# Patient Record
Sex: FEMALE | Race: WHITE | NOT HISPANIC OR LATINO | Employment: FULL TIME | URBAN - METROPOLITAN AREA
[De-identification: names, ages, dates, MRNs, and addresses within clinical notes are randomized per-mention and may not be internally consistent; named-entity substitution may affect disease eponyms.]

---

## 2017-07-06 ENCOUNTER — OFFICE VISIT (OUTPATIENT)
Dept: FAMILY MEDICINE | Facility: CLINIC | Age: 32
End: 2017-07-06
Payer: COMMERCIAL

## 2017-07-06 VITALS
WEIGHT: 208 LBS | SYSTOLIC BLOOD PRESSURE: 130 MMHG | HEART RATE: 81 BPM | BODY MASS INDEX: 36.86 KG/M2 | DIASTOLIC BLOOD PRESSURE: 88 MMHG | TEMPERATURE: 98 F | HEIGHT: 63 IN

## 2017-07-06 DIAGNOSIS — Z12.39 SCREENING BREAST EXAMINATION: Primary | ICD-10-CM

## 2017-07-06 DIAGNOSIS — Z12.31 ENCOUNTER FOR SCREENING MAMMOGRAM FOR BREAST CANCER: ICD-10-CM

## 2017-07-06 PROCEDURE — 99213 OFFICE O/P EST LOW 20 MIN: CPT | Performed by: NURSE PRACTITIONER

## 2017-07-06 NOTE — NURSING NOTE
"Chief Complaint   Patient presents with     Breast Exam     [place]   OPP101   breast and pelvic exam only       Initial /88  Pulse 81  Temp 98  F (36.7  C) (Oral)  Ht 5' 2.5\" (1.588 m)  Wt 208 lb (94.3 kg)  LMP 06/27/2017 (Exact Date)  BMI 37.44 kg/m2 Estimated body mass index is 37.44 kg/(m^2) as calculated from the following:    Height as of this encounter: 5' 2.5\" (1.588 m).    Weight as of this encounter: 208 lb (94.3 kg).  Medication Reconciliation: complete  "

## 2017-07-06 NOTE — MR AVS SNAPSHOT
After Visit Summary   7/6/2017    Lina Butterfield    MRN: [id number]           Patient Information     Date Of Birth          1985        Visit Information        Provider Department      7/6/2017 11:00 AM Tamiko Scott APRN [facility]        Today's Diagnoses     Screening breast examination    -  1    Encounter for screening mammogram for breast cancer          Care Instructions      Preventive Health Recommendations  Female Ages 26 - 39  Yearly exam:   See your health care provider every year in order to    Review health changes.     Discuss preventive care.      Review your medicines if you your doctor has prescribed any.    Until age 30: Get a Pap test every three years (more often if you have had an abnormal result).    After age 30: Talk to your doctor about whether you should have a Pap test every 3 years or have a Pap test with HPV screening every 5 years.   You do not need a Pap test if your uterus was removed (hysterectomy) and you have not had cancer.  You should be tested each year for STDs (sexually transmitted diseases), if you're at risk.   Talk to your provider about how often to have your cholesterol checked.  If you are at risk for diabetes, you should have a diabetes test (fasting glucose).  Shots: Get a flu shot each year. Get a tetanus shot every 10 years.   Nutrition:     Eat at least 5 servings of fruits and vegetables each day.    Eat whole-grain bread, whole-wheat pasta and brown rice instead of white grains and rice.    Talk to your provider about Calcium and Vitamin D.     Lifestyle    Exercise at least 150 minutes a week (30 minutes a day, 5 days of the week). This will help you control your weight and prevent disease.    Limit alcohol to one drink per day.    No smoking.     Wear sunscreen to prevent skin cancer.    See your dentist every six months for an exam and cleaning.            Follow-ups after your visit        Your next 10  appointments already scheduled     Jul 07, 2017 12:15 PM CDT   MA SCREENING DIGITAL BILATERAL with WYMA2   [facility] ([facility])    [address]  [facility] [address]   [phone]           Do not use any powder, lotion or deodorant under your arms or on your breast. If you do, we will ask you to remove it before your exam.  Wear comfortable, two-piece clothing.  If you have any allergies, tell your care team.  Bring any previous mammograms from other facilities or have them mailed to the breast center.              Future tests that were ordered for you today     Open Future Orders        Priority Expected Expires Ordered    MA Screening Digital Bilateral Routine  7/6/2018 7/6/2017            Who to contact     If you have questions or need follow up information about today's clinic visit or your schedule please contact [facility] directly at [phone].  Normal or non-critical lab and imaging results will be communicated to you by MyChart, letter or phone within 4 business days after the clinic has received the results. If you do not hear from us within 7 days, please contact the clinic through [employer] or phone. If you have a critical or abnormal lab result, we will notify you by phone as soon as possible.  Submit refill requests through [employer] or call your pharmacy and they will forward the refill request to us. Please allow 3 business days for your refill to be completed.          Additional Information About Your Visit        Lumate Information     [employer] gives you secure access to your electronic health record. If you see a primary care provider, you can also send messages to your care team and make appointments. If you have questions, please call your primary care clinic.  If you do not have a primary care provider, please call [phone] and they will assist you.        Care EveryWhere ID     This is your Care EveryWhere ID. This could be used  "by other organizations to access your [place] medical records  [id number]        Your Vitals Were     Pulse Temperature Height Last Period BMI (Body Mass Index)       81 98  F (36.7  C) (Oral) 5' 2.5\" (1.588 m) 06/27/2017 (Exact Date) 37.44 kg/m2        Blood Pressure from Last 3 Encounters:   07/06/17 130/88   07/07/16 111/73   06/30/16 119/80    Weight from Last 3 Encounters:   07/06/17 208 lb (94.3 kg)   06/30/16 205 lb (93 kg)   08/12/13 219 lb (99.3 kg)               Primary Care Provider Office Phone #    [facility] [phone] [address] [facility] [phone]        Equal Access to Services     CHELSI MENDOZA : Hadii symone turner Sorola, waalexandrada luqadaha, qaybta kaalmada adegricel, buffy brown. So [facility] [phone].    ATENCIÓN: Si habla español, tiene a reveles disposición servicios gratuitos de asistencia lingüística. Bernabe al [phone].    We comply with applicable federal civil rights laws and [place] laws. We do not discriminate on the basis of race, color, national origin, age, disability sex, sexual orientation or gender identity.            Thank you!     Thank you for choosing [facility]  for your care. Our goal is always to provide you with excellent care. Hearing back from our patients is one way we can continue to improve our services. Please take a few minutes to complete the written survey that you may receive in the mail after your visit with us. Thank you!             Your Updated Medication List - Protect others around you: Learn how to safely use, store and throw away your medicines at www.disposemymeds.org.      Notice  As of 7/6/2017 12:34 PM    You have not been prescribed any medications.      "

## 2017-07-06 NOTE — PROGRESS NOTES
".ginasobrent    SUBJECTIVE:                                                    Lina Butterfield is a 31 year old female who presents to clinic today for the following health issues:      Patient is here today for screening breast exam - YARI  Needs breast exam and then mammogram orders.  Has fibrocystic breasts - had abnormal mammo last year  Led to a breast biopsy - pathology was normal.  No family history of breast cancer.      Problem list and histories reviewed & adjusted, as indicated.  Additional history: as documented      Reviewed and updated as needed this visit by clinical staff  Tobacco  Allergies  Meds  Med Hx  Surg Hx  Fam Hx  Soc Hx      Reviewed and updated as needed this visit by Provider         ROS:  Constitutional, HEENT, cardiovascular, pulmonary, gi and gu systems are negative, except as otherwise noted.    OBJECTIVE:     /88  Pulse 81  Temp 98  F (36.7  C) (Oral)  Ht 5' 2.5\" (1.588 m)  Wt 208 lb (94.3 kg)  LMP 06/27/2017 (Exact Date)  BMI 37.44 kg/m2  Body mass index is 37.44 kg/(m^2).  GENERAL: healthy, alert and no distress  BREAST: normal without masses, tenderness or nipple discharge and no palpable axillary masses or adenopathy        ASSESSMENT/PLAN:       ICD-10-CM    1. Screening breast examination Z12.39    2. Encounter for screening mammogram for breast cancer Z12.31 MA Screening Digital Bilateral       The risks, benefits and treatment options of prescribed medications or other treatments have been discussed with the patient. The patient verbalized their understanding and should call or follow up if no improvement or if they develop further problems.    OTILIA Guadalupe [facility]  "

## 2017-07-07 ENCOUNTER — HOSPITAL ENCOUNTER (OUTPATIENT)
Dept: MAMMOGRAPHY | Facility: CLINIC | Age: 32
Discharge: HOME | End: 2017-07-07
Attending: NURSE PRACTITIONER | Admitting: NURSE PRACTITIONER
Payer: COMMERCIAL

## 2017-07-07 DIAGNOSIS — Z12.31 ENCOUNTER FOR SCREENING MAMMOGRAM FOR BREAST CANCER: ICD-10-CM

## 2017-07-07 PROCEDURE — G0202 SCR MAMMO BI INCL CAD: HCPCS

## 2018-09-11 ENCOUNTER — OFFICE VISIT (OUTPATIENT)
Dept: FAMILY MEDICINE | Facility: CLINIC | Age: 33
End: 2018-09-11
Payer: COMMERCIAL

## 2018-09-11 VITALS
WEIGHT: 202 LBS | SYSTOLIC BLOOD PRESSURE: 117 MMHG | HEIGHT: 63 IN | BODY MASS INDEX: 35.79 KG/M2 | DIASTOLIC BLOOD PRESSURE: 79 MMHG | TEMPERATURE: 98.4 F | HEART RATE: 89 BPM

## 2018-09-11 DIAGNOSIS — Z12.31 ENCOUNTER FOR SCREENING MAMMOGRAM FOR BREAST CANCER: Primary | ICD-10-CM

## 2018-09-11 PROCEDURE — 99213 OFFICE O/P EST LOW 20 MIN: CPT | Performed by: PHYSICIAN ASSISTANT

## 2018-09-11 NOTE — PROGRESS NOTES
"  SUBJECTIVE:   Lina Butterfield is a 32 year old female who presents to clinic today for the following health issues:      Patient is here today to get her annual breast exam. She also needs an order for a mammogram. She was told she needs to get one once a year due to fibroids.    Here for annual breast screen and mammogram orders  H/o fibrocystic breasts  Had biopsy in 2016 - pathology normal  Previously reported no family h/o breast CA but recently found out maternal grandmother had breast CA possibly in her late 20's or 30's    Problem list and histories reviewed & adjusted, as indicated.  Additional history: as documented    No current outpatient prescriptions on file.     Allergies   Allergen Reactions     Erythromycin Swelling     Intolerance.  Swelling in arm     BP Readings from Last 3 Encounters:   09/11/18 117/79   07/06/17 130/88   07/07/16 111/73    Wt Readings from Last 3 Encounters:   09/11/18 202 lb (91.6 kg)   07/06/17 208 lb (94.3 kg)   06/30/16 205 lb (93 kg)                    Reviewed and updated as needed this visit by clinical staff       Reviewed and updated as needed this visit by Provider         ROS:  Remainder of ROS obtained and found to be negative other than that which was documented above      OBJECTIVE:     /79  Pulse 89  Temp 98.4  F (36.9  C) (Tympanic)  Ht 5' 2.5\" (1.588 m)  Wt 202 lb (91.6 kg)  BMI 36.36 kg/m2  Body mass index is 36.36 kg/(m^2).  GENERAL: healthy, alert and no distress  BREAST: normal without masses, tenderness or nipple discharge and no palpable axillary masses or adenopathy    Diagnostic Test Results:  none     ASSESSMENT/PLAN:     (Z12.31) Encounter for screening mammogram for breast cancer  (primary encounter diagnosis)  Comment:   Plan: MA Screen Bilateral w/Noel            Per health maintenance due for tetanus - patient declined. Said she would get it through work    Declined flu shot. Says she never gets the flu shot      Amber Lopez, " THOMAS  [facility]

## 2018-09-11 NOTE — MR AVS SNAPSHOT
After Visit Summary   9/11/2018    Lina Butterfield    MRN: [id number]           Patient Information     Date Of Birth          1985        Visit Information        Provider Department      9/11/2018 8:20 AM Amber Lpoez PA-C [facility]        Today's Diagnoses     Encounter for screening mammogram for breast cancer    -  1      Care Instructions    Call [phone] to schedule the mammogram          Follow-ups after your visit        Future tests that were ordered for you today     Open Future Orders        Priority Expected Expires Ordered    MA Screen Bilateral w/Neol Routine  9/11/2019 9/11/2018            Who to contact     Normal or non-critical lab and imaging results will be communicated to you by [employer]hart, letter or phone within 4 business days after the clinic has received the results. If you do not hear from us within 7 days, please contact the clinic through [employer] or phone. If you have a critical or abnormal lab result, we will notify you by phone as soon as possible.  Submit refill requests through [employer] or call your pharmacy and they will forward the refill request to us. Please allow 3 business days for your refill to be completed.          If you need to speak with a  for additional information , please call: [phone]             Additional Information About Your Visit        [employer] Information     [employer] gives you secure access to your electronic health record. If you see a primary care provider, you can also send messages to your care team and make appointments. If you have questions, please call your primary care clinic.  If you do not have a primary care provider, please call [phone] and they will assist you.        Care EveryWhere ID     This is your Care EveryWhere ID. This could be used by other organizations to access your [place] medical records  [id number]        Your Vitals Were     Pulse Temperature Height BMI  "(Body Mass Index)          89 98.4  F (36.9  C) (Tympanic) 5' 2.5\" (1.588 m) 36.36 kg/m2         Blood Pressure from Last 3 Encounters:   09/11/18 117/79   07/06/17 130/88   07/07/16 111/73    Weight from Last 3 Encounters:   09/11/18 202 lb (91.6 kg)   07/06/17 208 lb (94.3 kg)   06/30/16 205 lb (93 kg)               Primary Care Provider Office Phone # Fax #    [facility] [phone] [address] [facility] [address]        Equal Access to Services     CHELSI MENDOZA : Iggy Fraga, waalexandrada zoraidaadaha, qaybta kaalmada adejuan ayamadi, buffy brown. So [facility] [phone].    ATENCIÓN: Si habla español, tiene a reveles disposición servicios gratuitos de asistencia lingüística. Llame al [phone].    We comply with applicable federal civil rights laws and [place] laws. We do not discriminate on the basis of race, color, national origin, age, disability, sex, sexual orientation, or gender identity.            Thank you!     Thank you for choosing [facility]  for your care. Our goal is always to provide you with excellent care. Hearing back from our patients is one way we can continue to improve our services. Please take a few minutes to complete the written survey that you may receive in the mail after your visit with us. Thank you!             Your Updated Medication List - Protect others around you: Learn how to safely use, store and throw away your medicines at www.disposemymeds.org.      Notice  As of 9/11/2018  8:48 AM    You have not been prescribed any medications.      "

## 2018-10-05 ENCOUNTER — HOSPITAL ENCOUNTER (OUTPATIENT)
Dept: MAMMOGRAPHY | Facility: CLINIC | Age: 33
Discharge: HOME | End: 2018-10-05
Attending: PHYSICIAN ASSISTANT | Admitting: PHYSICIAN ASSISTANT
Payer: COMMERCIAL

## 2018-10-05 DIAGNOSIS — Z12.31 ENCOUNTER FOR SCREENING MAMMOGRAM FOR BREAST CANCER: ICD-10-CM

## 2018-10-05 PROCEDURE — 77063 BREAST TOMOSYNTHESIS BI: CPT

## 2019-07-08 ENCOUNTER — TELEPHONE (OUTPATIENT)
Dept: FAMILY MEDICINE | Facility: CLINIC | Age: 34
End: 2019-07-08

## 2019-07-08 NOTE — TELEPHONE ENCOUNTER
Panel Management Review      Patient has the following on her problem list: None      Composite cancer screening  Chart review shows that this patient is due/due soon for the following Pap Smear  Summary:    Patient is due/failing the following:   PAP and PHYSICAL    Action needed:   Patient needs office visit for PEPAP.    Type of outreach:    Sent letter.    Questions for provider review:    None                                                                                                                                    Rody Diaz CMA     Chart routed to self .

## 2019-07-08 NOTE — LETTER
July 8, 2019      Lina Butterfield  [address]  [facility] [place] [address]        Dear Lina,    In order to ensure we are providing the best quality care, Amber and SAMUEL have reviewed your chart and see that you are due for a yearly Physical including a Pap.    Please call the clinic to set up an office visit at [phone] or [phone] at your earliest convenience.    We greatly appreciate the opportunity to serve you. Thank you for trusting us with your health care.      Sincerely,      THOMAS Kraft, CMA

## 2019-11-02 ENCOUNTER — HEALTH MAINTENANCE LETTER (OUTPATIENT)
Age: 34
End: 2019-11-02

## 2020-02-10 ENCOUNTER — HEALTH MAINTENANCE LETTER (OUTPATIENT)
Age: 35
End: 2020-02-10

## 2020-11-16 ENCOUNTER — HEALTH MAINTENANCE LETTER (OUTPATIENT)
Age: 35
End: 2020-11-16

## 2021-09-12 ENCOUNTER — HEALTH MAINTENANCE LETTER (OUTPATIENT)
Age: 36
End: 2021-09-12

## 2021-09-30 ENCOUNTER — TRANSFERRED RECORDS (OUTPATIENT)
Dept: MULTI SPECIALTY CLINIC | Facility: CLINIC | Age: 36
End: 2021-09-30

## 2021-09-30 LAB
HPV ABSTRACT: NORMAL
PAP-ABSTRACT: NORMAL

## 2022-01-02 ENCOUNTER — HEALTH MAINTENANCE LETTER (OUTPATIENT)
Age: 37
End: 2022-01-02

## 2022-05-13 ENCOUNTER — OFFICE VISIT (OUTPATIENT)
Dept: FAMILY MEDICINE | Facility: CLINIC | Age: 37
End: 2022-05-13
Payer: COMMERCIAL

## 2022-05-13 VITALS
DIASTOLIC BLOOD PRESSURE: 82 MMHG | HEIGHT: 63 IN | WEIGHT: 214.2 LBS | OXYGEN SATURATION: 98 % | HEART RATE: 108 BPM | SYSTOLIC BLOOD PRESSURE: 128 MMHG | RESPIRATION RATE: 16 BRPM | TEMPERATURE: 97.2 F | BODY MASS INDEX: 37.95 KG/M2

## 2022-05-13 DIAGNOSIS — R60.0 LOWER EXTREMITY EDEMA: Primary | ICD-10-CM

## 2022-05-13 LAB
ALBUMIN SERPL-MCNC: 3.4 G/DL (ref 3.4–5)
ALP SERPL-CCNC: 83 U/L (ref 40–150)
ALT SERPL W P-5'-P-CCNC: 25 U/L (ref 0–50)
ANION GAP SERPL CALCULATED.3IONS-SCNC: 6 MMOL/L (ref 3–14)
AST SERPL W P-5'-P-CCNC: 17 U/L (ref 0–45)
BILIRUB SERPL-MCNC: 0.4 MG/DL (ref 0.2–1.3)
BUN SERPL-MCNC: 9 MG/DL (ref 7–30)
CALCIUM SERPL-MCNC: 8.8 MG/DL (ref 8.5–10.1)
CHLORIDE BLD-SCNC: 106 MMOL/L (ref 94–109)
CO2 SERPL-SCNC: 27 MMOL/L (ref 20–32)
CREAT SERPL-MCNC: 0.67 MG/DL (ref 0.52–1.04)
ERYTHROCYTE [DISTWIDTH] IN BLOOD BY AUTOMATED COUNT: 12.6 % (ref 10–15)
GFR SERPL CREATININE-BSD FRML MDRD: >90 ML/MIN/1.73M2
GLUCOSE BLD-MCNC: 88 MG/DL (ref 70–99)
HCT VFR BLD AUTO: 44.3 % (ref 35–47)
HGB BLD-MCNC: 14.5 G/DL (ref 11.7–15.7)
MCH RBC QN AUTO: 29.8 PG (ref 26.5–33)
MCHC RBC AUTO-ENTMCNC: 32.7 G/DL (ref 31.5–36.5)
MCV RBC AUTO: 91 FL (ref 78–100)
PLATELET # BLD AUTO: 245 10E3/UL (ref 150–450)
POTASSIUM BLD-SCNC: 3.8 MMOL/L (ref 3.4–5.3)
PROT SERPL-MCNC: 6.9 G/DL (ref 6.8–8.8)
RBC # BLD AUTO: 4.86 10E6/UL (ref 3.8–5.2)
SODIUM SERPL-SCNC: 139 MMOL/L (ref 133–144)
WBC # BLD AUTO: 8 10E3/UL (ref 4–11)

## 2022-05-13 PROCEDURE — 36415 COLL VENOUS BLD VENIPUNCTURE: CPT | Performed by: NURSE PRACTITIONER

## 2022-05-13 PROCEDURE — 85027 COMPLETE CBC AUTOMATED: CPT | Performed by: NURSE PRACTITIONER

## 2022-05-13 PROCEDURE — 80053 COMPREHEN METABOLIC PANEL: CPT | Performed by: NURSE PRACTITIONER

## 2022-05-13 PROCEDURE — 99203 OFFICE O/P NEW LOW 30 MIN: CPT | Performed by: NURSE PRACTITIONER

## 2022-05-13 RX ORDER — FUROSEMIDE 20 MG
20 TABLET ORAL PRN
Qty: 30 TABLET | Refills: 1 | Status: SHIPPED | OUTPATIENT
Start: 2022-05-13

## 2022-05-13 RX ORDER — HYDROXYZINE HYDROCHLORIDE 25 MG/1
25 TABLET, FILM COATED ORAL 3 TIMES DAILY PRN
COMMUNITY

## 2022-05-13 ASSESSMENT — PAIN SCALES - GENERAL: PAINLEVEL: MILD PAIN (2)

## 2022-05-13 NOTE — PATIENT INSTRUCTIONS
Labs today.  Please contact the cardiac testing department at [phone] to schedule echocardiogram.  Lasix daily for leg swelling.

## 2022-05-13 NOTE — PROGRESS NOTES
"  Assessment & Plan     Lower extremity edema  Ongoing intermittently for the past year. Will check labs, echo. If negative, consider venous competence US. Lasix, compression stockings. Discussed will add K+ if low, recheck BMP in 2 weeks if consistently using Lasix.   - CBC with platelets; Future  - Comprehensive metabolic panel (BMP + Alb, Alk Phos, ALT, AST, Total. Bili, TP); Future  - Echocardiogram Complete; Future  - furosemide (LASIX) 20 MG tablet; Take 1 tablet (20 mg) by mouth as needed (Take one tablet daily for leg swelling)         BMI:   Estimated body mass index is 38.43 kg/m  as calculated from the following:    Height as of this encounter: 1.59 m (5' 2.6\").    Weight as of this encounter: 97.2 kg (214 lb 3.2 oz).       Patient Instructions   Labs today.  Please contact the cardiac testing department at [phone] to schedule echocardiogram.  Lasix daily for leg swelling.          Return in about 1 week (around 5/20/2022) for worsening or continued symptoms.    OTILIA [facility]    [place] is a 36 year old who presents for the following health issues     HPI     Concern - leg swelling  Onset: has been fully swollen and irritated for about a week. Getting more persistent for the last 2 weeks   Description: both legs, more so the left one  Intensity: moderate 2/10 currently on pain scale 8/10 at it's worst  Progression of Symptoms:  worsening  Accompanying Signs & Symptoms: ankle pain  Previous history of similar problem: yes, about a year ago she went in for edema, not painful and it would go down.   Precipitating factors:        Worsened by: none  Alleviating factors:        Improved by: elevating when she sleeps helps some.  Therapies tried and outcome: she has been drinking more water and decreasing her salt. She recently (last week) received a letter from the [facility] saying her water has elevated levels of magnesium and to drink " "bottled water only. She is unsure if this would be related  Has tried icing and cutting sugars but nothing has really helped.      Above HPI reviewed. Additionally, this has been occurring intermittently for the past year, worsening now. Chart review shows she was treated with Lasix about a year ago. Is not currently using compression hose. No chest pain. Occasional exertional dyspnea. Bilateral pain to ankles. Not really improved when she wakes up.       Review of Systems   Constitutional, HEENT, cardiovascular, pulmonary, gi and gu systems are negative, except as otherwise noted.      Objective    /82 (BP Location: Right arm, Patient Position: Sitting, Cuff Size: Adult Large)   Pulse 108   Temp 97.2  F (36.2  C) (Tympanic)   Resp 16   Ht 1.59 m (5' 2.6\")   Wt 97.2 kg (214 lb 3.2 oz)   SpO2 98%   Breastfeeding No   BMI 38.43 kg/m    Body mass index is 38.43 kg/m .  Physical Exam  Vitals and nursing note reviewed.   Constitutional:       Appearance: Normal appearance.   HENT:      Head: Normocephalic and atraumatic.      Mouth/Throat:      Mouth: Mucous membranes are moist.   Eyes:      Comments: Non-icteric   Cardiovascular:      Rate and Rhythm: Normal rate and regular rhythm.      Pulses: Normal pulses.      Heart sounds: Normal heart sounds, S1 normal and S2 normal. Heart sounds not distant. No murmur heard.    No friction rub. No gallop.   Pulmonary:      Effort: Pulmonary effort is normal.      Breath sounds: Normal breath sounds.   Abdominal:      General: Abdomen is flat. Bowel sounds are normal.      Palpations: Abdomen is soft.   Musculoskeletal:      Cervical back: Neck supple.      Right lower leg: Edema present.      Left lower leg: Edema present.   Skin:     General: Skin is warm and dry.      Capillary Refill: Capillary refill takes less than 2 seconds.   Neurological:      General: No focal deficit present.      Mental Status: She is alert and oriented to person, place, and time. "   Psychiatric:         Mood and Affect: Mood normal.         Behavior: Behavior normal.         Thought Content: Thought content normal.         Judgment: Judgment normal.            Results for orders placed or performed in visit on 05/13/22 (from the past 24 hour(s))   CBC with platelets   Result Value Ref Range    WBC Count 8.0 4.0 - 11.0 10e3/uL    RBC Count 4.86 3.80 - 5.20 10e6/uL    Hemoglobin 14.5 11.7 - 15.7 g/dL    Hematocrit 44.3 35.0 - 47.0 %    MCV 91 78 - 100 fL    MCH 29.8 26.5 - 33.0 pg    MCHC 32.7 31.5 - 36.5 g/dL    RDW 12.6 10.0 - 15.0 %    Platelet Count 245 150 - 450 10e3/uL

## 2022-05-31 ENCOUNTER — HOSPITAL ENCOUNTER (OUTPATIENT)
Dept: CARDIOLOGY | Facility: CLINIC | Age: 37
Discharge: HOME | End: 2022-05-31
Attending: NURSE PRACTITIONER | Admitting: NURSE PRACTITIONER
Payer: COMMERCIAL

## 2022-05-31 ENCOUNTER — LAB (OUTPATIENT)
Dept: LAB | Facility: CLINIC | Age: 37
End: 2022-05-31
Payer: COMMERCIAL

## 2022-05-31 DIAGNOSIS — R60.0 LOWER EXTREMITY EDEMA: ICD-10-CM

## 2022-05-31 LAB
ANION GAP SERPL CALCULATED.3IONS-SCNC: 5 MMOL/L (ref 3–14)
BUN SERPL-MCNC: 11 MG/DL (ref 7–30)
CALCIUM SERPL-MCNC: 9.1 MG/DL (ref 8.5–10.1)
CHLORIDE BLD-SCNC: 105 MMOL/L (ref 94–109)
CO2 SERPL-SCNC: 28 MMOL/L (ref 20–32)
CREAT SERPL-MCNC: 0.73 MG/DL (ref 0.52–1.04)
GFR SERPL CREATININE-BSD FRML MDRD: >90 ML/MIN/1.73M2
GLUCOSE BLD-MCNC: 76 MG/DL (ref 70–99)
LVEF ECHO: NORMAL
POTASSIUM BLD-SCNC: 4.1 MMOL/L (ref 3.4–5.3)
SODIUM SERPL-SCNC: 138 MMOL/L (ref 133–144)

## 2022-05-31 PROCEDURE — 36415 COLL VENOUS BLD VENIPUNCTURE: CPT

## 2022-05-31 PROCEDURE — 93306 TTE W/DOPPLER COMPLETE: CPT | Mod: 26 | Performed by: INTERNAL MEDICINE

## 2022-05-31 PROCEDURE — 93306 TTE W/DOPPLER COMPLETE: CPT

## 2022-05-31 PROCEDURE — 80048 BASIC METABOLIC PNL TOTAL CA: CPT

## 2022-08-03 ENCOUNTER — MYC MEDICAL ADVICE (OUTPATIENT)
Dept: FAMILY MEDICINE | Facility: CLINIC | Age: 37
End: 2022-08-03

## 2022-08-16 ENCOUNTER — OFFICE VISIT (OUTPATIENT)
Dept: FAMILY MEDICINE | Facility: CLINIC | Age: 37
End: 2022-08-16
Payer: COMMERCIAL

## 2022-08-16 VITALS
TEMPERATURE: 97 F | SYSTOLIC BLOOD PRESSURE: 118 MMHG | OXYGEN SATURATION: 100 % | WEIGHT: 205 LBS | HEART RATE: 117 BPM | HEIGHT: 63 IN | BODY MASS INDEX: 36.32 KG/M2 | DIASTOLIC BLOOD PRESSURE: 82 MMHG | RESPIRATION RATE: 18 BRPM

## 2022-08-16 DIAGNOSIS — E66.01 MORBID OBESITY (H): ICD-10-CM

## 2022-08-16 DIAGNOSIS — L73.9 FOLLICULITIS: ICD-10-CM

## 2022-08-16 DIAGNOSIS — L98.9 SKIN DISORDER: Primary | ICD-10-CM

## 2022-08-16 PROCEDURE — 99215 OFFICE O/P EST HI 40 MIN: CPT | Performed by: NURSE PRACTITIONER

## 2022-08-16 RX ORDER — MUPIROCIN 20 MG/G
OINTMENT TOPICAL 3 TIMES DAILY
Qty: 30 G | Refills: 0 | Status: SHIPPED | OUTPATIENT
Start: 2022-08-16 | End: 2024-06-24

## 2022-08-16 ASSESSMENT — ANXIETY QUESTIONNAIRES
7. FEELING AFRAID AS IF SOMETHING AWFUL MIGHT HAPPEN: NOT AT ALL
7. FEELING AFRAID AS IF SOMETHING AWFUL MIGHT HAPPEN: NOT AT ALL
4. TROUBLE RELAXING: NOT AT ALL
5. BEING SO RESTLESS THAT IT IS HARD TO SIT STILL: NOT AT ALL
GAD7 TOTAL SCORE: 5
GAD7 TOTAL SCORE: 5
2. NOT BEING ABLE TO STOP OR CONTROL WORRYING: SEVERAL DAYS
8. IF YOU CHECKED OFF ANY PROBLEMS, HOW DIFFICULT HAVE THESE MADE IT FOR YOU TO DO YOUR WORK, TAKE CARE OF THINGS AT HOME, OR GET ALONG WITH OTHER PEOPLE?: SOMEWHAT DIFFICULT
GAD7 TOTAL SCORE: 5
3. WORRYING TOO MUCH ABOUT DIFFERENT THINGS: SEVERAL DAYS
1. FEELING NERVOUS, ANXIOUS, OR ON EDGE: MORE THAN HALF THE DAYS
6. BECOMING EASILY ANNOYED OR IRRITABLE: SEVERAL DAYS
IF YOU CHECKED OFF ANY PROBLEMS ON THIS QUESTIONNAIRE, HOW DIFFICULT HAVE THESE PROBLEMS MADE IT FOR YOU TO DO YOUR WORK, TAKE CARE OF THINGS AT HOME, OR GET ALONG WITH OTHER PEOPLE: SOMEWHAT DIFFICULT

## 2022-08-16 ASSESSMENT — PAIN SCALES - GENERAL: PAINLEVEL: NO PAIN (0)

## 2022-08-16 NOTE — PROGRESS NOTES
"  Assessment & Plan       ICD-10-CM    1. Skin disorder  L98.9 Adult Dermatology Referral     mupirocin (BACTROBAN) 2 % external ointment   2. Folliculitis  L73.9 Adult Dermatology Referral     mupirocin (BACTROBAN) 2 % external ointment   3. Morbid obesity (H)  E66.01      Patient currently unable to work due to masking requirement at her previous employer.  Masks have significantly worsened her chronic folliculitis and causes persistent flares.  She has been out on disability due to this since in June 2020.  Referral placed today for dermatology for additional consult regarding ongoing management of her skin condition and folliculitis.  Paper work was brought in by patient which needs to be completed by her.  I advised patient if there is any specific paperwork for me to complete to drop it off at the clinic for me to review.    She will be due for her annual preventative care exam end of September.    [facility]  40 minutes spent on the date of the encounter doing chart review, history and exam, documentation and further activities per the note       BMI:   Estimated body mass index is 36.78 kg/m  as calculated from the following:    Height as of this encounter: 1.59 m (5' 2.6\").    Weight as of this encounter: 93 kg (205 lb).           Return in about 6 weeks (around 9/27/2022) for Routine preventive.    OTILIA Mary CNP  [facility]    [place] is a 36 year old, presenting for the following health issues:  Edema, Anxiety, Depression, and Forms      History of Present Illness       Mental Health Follow-up:  Patient presents to follow-up on Anxiety.    Patient's anxiety since last visit has been:  Medium  The patient is having other symptoms associated with anxiety.  Any significant life events: relationship concerns, job concerns, financial concerns, housing concerns, grief or loss " and health concerns  Patient is not feeling anxious or having panic attacks.  Patient has no concerns about alcohol or drug use.    Vascular Disease:  She presents for follow up of vascular disease.  She never takes nitroglycerin. She is not taking daily aspirin.    Reason for visit:  Discussing and continuation from old insurance and my dr left and I need forms for disability filled out and get new referrals for therapy, genetic testing, and leg edema. Need a new dr.    She eats 2-3 servings of fruits and vegetables daily.She consumes 4 sweetened beverage(s) daily.She exercises with enough effort to increase her heart rate 60 or more minutes per day.  She exercises with enough effort to increase her heart rate 4 days per week.   She is taking medications regularly.  Today's KONRAD-7 Score: 5    Patient is needing to establish care with a new PCP.  She has numerous concerns today however would like to focus today's visit on her chronic folliculitis/skin disorder and long-term disability from her job as a  at [employer].    She is currently out of work due to wearing masking and breakdown of skin related chronic staph. She has a history of recurrent folliculitis with MRSA history.  She unfortunately had worsening symptoms related to mask wearing which caused her skin breakdown to be worse.  She was seeing a PCP through [facility] but this individual has left.  She has seen dermatology in the past but has not seen them for quite some time.  She has tried numerous treatment options for management of her folliculitis without any significant changes in her long-term outcome and management.  Some of these treatments she mentions are creams, steroids, electrotherapy, and facials.    I personally reviewed notes from care everywhere from her primary care provider who she was previously seen through [facility].    She is not currently working in any other jobs due to skin breakdown and  "risks of infection that is related to her folliculitis.    She has a history of MRSA and will get ill with the infection. She is not an active  of MRSA historically.  She does her best to avoid oral antibiotics.    Review of Systems   Constitutional, HEENT, cardiovascular, pulmonary, gi and gu systems are negative, except as otherwise noted.      Objective    /82   Pulse 117   Temp 97  F (36.1  C) (Tympanic)   Resp 18   Ht 1.59 m (5' 2.6\")   Wt 93 kg (205 lb)   LMP 07/25/2022   SpO2 100%   Breastfeeding No   BMI 36.78 kg/m    Body mass index is 36.78 kg/m .     Physical Exam   GENERAL: healthy, alert and no distress  SKIN: Numerous open skin lesions on face where the mask sits.  Ranging in size of approximately 3 mm to 4 mm in diameter red flat and mildly inflamed.  Larger lesion on right side of scalp at hairline measuring approximately 6 mm in diameter.  No acute blisters or drainage noted on today's exam.  Lesions do appear moist.  No localized erythremia or swelling around lesions present.                    .  ..  " Never smoker

## 2022-10-27 ENCOUNTER — OFFICE VISIT (OUTPATIENT)
Dept: FAMILY MEDICINE | Facility: CLINIC | Age: 37
End: 2022-10-27
Payer: COMMERCIAL

## 2022-10-27 VITALS
DIASTOLIC BLOOD PRESSURE: 80 MMHG | SYSTOLIC BLOOD PRESSURE: 112 MMHG | WEIGHT: 214 LBS | TEMPERATURE: 97.4 F | OXYGEN SATURATION: 99 % | HEIGHT: 63 IN | HEART RATE: 100 BPM | BODY MASS INDEX: 37.92 KG/M2 | RESPIRATION RATE: 14 BRPM

## 2022-10-27 DIAGNOSIS — F32.1 MAJOR DEPRESSIVE DISORDER, SINGLE EPISODE, MODERATE (H): ICD-10-CM

## 2022-10-27 DIAGNOSIS — F41.9 ANXIETY: ICD-10-CM

## 2022-10-27 DIAGNOSIS — R60.0 LOWER EXTREMITY EDEMA: ICD-10-CM

## 2022-10-27 DIAGNOSIS — Z00.00 ROUTINE GENERAL MEDICAL EXAMINATION AT A HEALTH CARE FACILITY: Primary | ICD-10-CM

## 2022-10-27 PROCEDURE — 96127 BRIEF EMOTIONAL/BEHAV ASSMT: CPT | Performed by: NURSE PRACTITIONER

## 2022-10-27 PROCEDURE — 99214 OFFICE O/P EST MOD 30 MIN: CPT | Mod: 25 | Performed by: NURSE PRACTITIONER

## 2022-10-27 PROCEDURE — 99395 PREV VISIT EST AGE 18-39: CPT | Performed by: NURSE PRACTITIONER

## 2022-10-27 RX ORDER — ESCITALOPRAM OXALATE 10 MG/1
10 TABLET ORAL DAILY
Qty: 30 TABLET | Refills: 1 | Status: SHIPPED | OUTPATIENT
Start: 2022-10-27

## 2022-10-27 ASSESSMENT — ENCOUNTER SYMPTOMS
SHORTNESS OF BREATH: 0
ARTHRALGIAS: 0
DYSURIA: 0
COUGH: 0
EYE PAIN: 0
FREQUENCY: 0
CONSTIPATION: 0
NAUSEA: 0
PARESTHESIAS: 0
CHILLS: 0
HEADACHES: 0
HEARTBURN: 0
SORE THROAT: 0
HEMATURIA: 0
MYALGIAS: 1
NERVOUS/ANXIOUS: 1
HEMATOCHEZIA: 0
JOINT SWELLING: 0
DIARRHEA: 0
FEVER: 0
PALPITATIONS: 0
DIZZINESS: 0
WEAKNESS: 0
ABDOMINAL PAIN: 0

## 2022-10-27 ASSESSMENT — ANXIETY QUESTIONNAIRES
7. FEELING AFRAID AS IF SOMETHING AWFUL MIGHT HAPPEN: NOT AT ALL
3. WORRYING TOO MUCH ABOUT DIFFERENT THINGS: MORE THAN HALF THE DAYS
1. FEELING NERVOUS, ANXIOUS, OR ON EDGE: MORE THAN HALF THE DAYS
2. NOT BEING ABLE TO STOP OR CONTROL WORRYING: MORE THAN HALF THE DAYS
GAD7 TOTAL SCORE: 9
4. TROUBLE RELAXING: MORE THAN HALF THE DAYS
5. BEING SO RESTLESS THAT IT IS HARD TO SIT STILL: SEVERAL DAYS
GAD7 TOTAL SCORE: 9
6. BECOMING EASILY ANNOYED OR IRRITABLE: NOT AT ALL

## 2022-10-27 ASSESSMENT — PAIN SCALES - GENERAL: PAINLEVEL: NO PAIN (0)

## 2022-10-27 ASSESSMENT — PATIENT HEALTH QUESTIONNAIRE - PHQ9: SUM OF ALL RESPONSES TO PHQ QUESTIONS 1-9: 16

## 2022-10-27 NOTE — PROGRESS NOTES
SUBJECTIVE:   CC: Lina is an 37 year old who presents for preventive health visit.     Patient has been advised of split billing requirements and indicates understanding: Yes     Healthy Habits:     Getting at least 3 servings of Calcium per day:  Yes    Bi-annual eye exam:  NO    Dental care twice a year:  NO    Sleep apnea or symptoms of sleep apnea:  None    Diet:  Regular (no restrictions)    Frequency of exercise:  2-3 days/week    Duration of exercise:  15-30 minutes    Taking medications regularly:  Yes    PHQ-2 Total Score: 2    Additional concerns today:  Yes    Depression and Anxiety Follow-Up    How are you doing with your depression since your last visit? No change/ stable    How are you doing with your anxiety since your last visit?  Worsened increased with life stuff.     Are you having other symptoms that might be associated with depression or anxiety? Yes:  may have increased bilateral lower extremity swelling with worsened depression and anxiety symptoms. cardiac has been ruled out.     Have you had a significant life event? Relationship Concerns, Job Concerns, Financial Concerns and Grief or Loss     Do you have any concerns with your use of alcohol or other drugs? No    She has bilateral lower extremity edema, she does not wear compression stockings on a regular basis.     She is currently on disability for severe skin reactions with mask wearing. 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     Social History     Tobacco Use     Smoking status: Former     Packs/day: 0.25     Years: 3.00     Pack years: 0.75     Types: Cigarettes     Quit date: 2009     Years since quittin.2     Smokeless tobacco: Never   Vaping Use     Vaping Use: Never used   Substance Use Topics     Alcohol use: Yes     Comment: occ. social     Drug use: No     PHQ 10/27/2022   PHQ-9 Total Score 16   Q9: Thoughts of better off dead/self-harm past 2 weeks Not at  all     KONRAD-7 SCORE 8/16/2022 10/27/2022   Total Score 5 (mild anxiety) -   Total Score 5 9     Last PHQ-9 10/27/2022   1.  Little interest or pleasure in doing things 1   2.  Feeling down, depressed, or hopeless 2   3.  Trouble falling or staying asleep, or sleeping too much 3   4.  Feeling tired or having little energy 2   5.  Poor appetite or overeating 2   6.  Feeling bad about yourself 3   7.  Trouble concentrating 2   8.  Moving slowly or restless 1   Q9: Thoughts of better off dead/self-harm past 2 weeks 0   PHQ-9 Total Score 16     KONRAD-7  10/27/2022   1. Feeling nervous, anxious, or on edge 2   2. Not being able to stop or control worrying 2   3. Worrying too much about different things 2   4. Trouble relaxing 2   5. Being so restless that it is hard to sit still 1   6. Becoming easily annoyed or irritable 0   7. Feeling afraid, as if something awful might happen 0   KONRAD-7 Total Score 9   If you checked any problems, how difficult have they made it for you to do your work, take care of things at home, or get along with other people? -       Suicide Assessment Five-step Evaluation and Treatment (SAFE-T)      Today's PHQ-2 Score:   PHQ-2 ( 1999 Pfizer) 10/27/2022   Q1: Little interest or pleasure in doing things 1   Q2: Feeling down, depressed or hopeless 1   PHQ-2 Score 2   Q1: Little interest or pleasure in doing things Several days   Q2: Feeling down, depressed or hopeless Several days   PHQ-2 Score 2       Abuse: Current or Past (Physical, Sexual or Emotional) - No  Do you feel safe in your environment? Yes    Have you ever done Advance Care Planning? (For example, a Health Directive, POLST, or a discussion with a medical provider or your loved ones about your wishes): Yes, patient states has an Advance Care Planning document and will bring a copy to the clinic.    Social History     Tobacco Use     Smoking status: Former     Packs/day: 0.25     Years: 3.00     Pack years: 0.75     Types: Cigarettes      Quit date: 2009     Years since quittin.2     Smokeless tobacco: Never   Substance Use Topics     Alcohol use: Yes     Comment: occ. social     If you drink alcohol do you typically have >3 drinks per day or >7 drinks per week? No    Alcohol Use 10/27/2022   Prescreen: >3 drinks/day or >7 drinks/week? No   Prescreen: >3 drinks/day or >7 drinks/week? -       Reviewed orders with patient.  Reviewed health maintenance and updated orders accordingly - Yes  Lab work is in process  Labs reviewed in EPIC  BP Readings from Last 3 Encounters:   10/27/22 112/80   22 118/82   22 128/82    Wt Readings from Last 3 Encounters:   10/27/22 97.1 kg (214 lb)   22 93 kg (205 lb)   22 97.2 kg (214 lb 3.2 oz)                  Patient Active Problem List   Diagnosis     Contraceptive management     Obesity     Contact dermatitis and other eczema, due to unspecified cause     Skin disorder     Chronic rhinitis     Hypertriglyceridemia     Lifestyle     CARDIOVASCULAR SCREENING; LDL GOAL LESS THAN 130     Morbid obesity (H)     Past Surgical History:   Procedure Laterality Date     NO HISTORY OF SURGERY         Social History     Tobacco Use     Smoking status: Former     Packs/day: 0.25     Years: 3.00     Pack years: 0.75     Types: Cigarettes     Quit date: 2009     Years since quittin.2     Smokeless tobacco: Never   Substance Use Topics     Alcohol use: Yes     Comment: occ. social     Family History   Problem Relation Age of Onset     Allergies Mother      Obesity Mother      Allergies Brother      Gastrointestinal Disease Brother         crohn's     Allergies Sister      Diabetes Paternal Grandmother      Obesity Paternal Grandmother      Diabetes Paternal Grandfather      Obesity Paternal Grandfather      Obesity Maternal Grandmother      Obesity Maternal Grandfather      Asthma No family hx of      C.A.D. No family hx of      Hypertension No family hx of      Cerebrovascular Disease No  family hx of      Breast Cancer No family hx of      Cancer - colorectal No family hx of      Prostate Cancer No family hx of          Current Outpatient Medications   Medication Sig Dispense Refill     escitalopram (LEXAPRO) 10 MG tablet Take 1 tablet (10 mg) by mouth daily 30 tablet 1     furosemide (LASIX) 20 MG tablet Take 1 tablet (20 mg) by mouth as needed (Take one tablet daily for leg swelling) 30 tablet 1     hydrOXYzine (ATARAX) 25 MG tablet Take 25 mg by mouth 3 times daily as needed for itching       mupirocin (BACTROBAN) 2 % external ointment Apply topically 3 times daily 30 g 0     Allergies   Allergen Reactions     Erythromycin Swelling     Intolerance.  Swelling in arm     Recent Labs   Lab Test 22  1245 22  1446   ALT  --  25   CR 0.73 0.67   GFRESTIMATED >90 >90   POTASSIUM 4.1 3.8        Breast Cancer Screening:    Breast CA Risk Assessment (FHS-7) 10/27/2022   Do you have a family history of breast, colon, or ovarian cancer? No / Unknown         Patient under 40 years of age: Routine Mammogram Screening not recommended.   Pertinent mammograms are reviewed under the imaging tab.    History of abnormal Pap smear: NO - age 30-65 PAP every 5 years with negative HPV co-testing recommended  PAP / HPV 2016   PAP (Historical) NIL NIL NIL     Reviewed and updated as needed this visit by clinical staff   Tobacco  Allergies  Meds  Problems  Med Hx  Surg Hx  Fam Hx  Soc   Hx        Reviewed and updated as needed this visit by Provider   Tobacco  Allergies  Meds  Problems  Med Hx  Surg Hx  Fam Hx         Past Medical History:   Diagnosis Date     Other staphylococcus infection in conditions classified elsewhere and of unspecified site      Supervision of normal first pregnancy 2009     Tobacco use disorder      Unspecified otitis media       Past Surgical History:   Procedure Laterality Date     NO HISTORY OF SURGERY       OB History    Para Term  " AB Living   1 1 1 0 0 1   SAB IAB Ectopic Multiple Live Births   0 0 0 0 1      # Outcome Date GA Lbr Shaun/2nd Weight Sex Delivery Anes PTL Lv   1 Term 01/17/10 39w0d  3.26 kg (7 lb 3 oz) M Vag-Vacuum None N EDGAR      Name: Matteo      Apgar1: 3  Apgar5: 6       Review of Systems   Constitutional: Negative for chills and fever.   HENT: Negative for congestion, ear pain, hearing loss and sore throat.    Eyes: Negative for pain and visual disturbance.   Respiratory: Negative for cough and shortness of breath.    Cardiovascular: Positive for peripheral edema. Negative for chest pain and palpitations.   Gastrointestinal: Negative for abdominal pain, constipation, diarrhea, heartburn, hematochezia and nausea.   Genitourinary: Negative for dysuria, frequency, genital sores, hematuria and urgency.   Musculoskeletal: Positive for myalgias. Negative for arthralgias and joint swelling.   Skin: Negative for rash.   Neurological: Negative for dizziness, weakness, headaches and paresthesias.   Psychiatric/Behavioral: Positive for mood changes. The patient is nervous/anxious.         OBJECTIVE:   /80   Pulse 100   Temp 97.4  F (36.3  C) (Tympanic)   Resp 14   Ht 1.6 m (5' 3\")   Wt 97.1 kg (214 lb)   LMP 2022 (Approximate)   SpO2 99%   BMI 37.91 kg/m       Physical Exam  GENERAL: healthy, alert and no distress  EYES: Eyes grossly normal to inspection, PERRL and conjunctivae and sclerae normal  HENT: ear canals and TM's normal, nose and mouth without ulcers or lesions  NECK: no adenopathy, no asymmetry, masses, or scars and thyroid normal to palpation  RESP: lungs clear to auscultation - no rales, rhonchi or wheezes  BREAST: normal without masses, tenderness or nipple discharge and no palpable axillary masses or adenopathy  CV: regular rate and rhythm, normal S1 S2, no S3 or S4, no murmur, click or rub, no peripheral edema and peripheral pulses strong  ABDOMEN: soft, nontender, no hepatosplenomegaly, no " "masses and bowel sounds normal  MS: no gross musculoskeletal defects noted, no edema  SKIN: no suspicious lesions or rashes  NEURO: Normal strength and tone, mentation intact and speech normal  PSYCH: mentation appears normal, affect normal/bright    ASSESSMENT/PLAN:       ICD-10-CM    1. Routine general medical examination at a health care facility  Z00.00       2. Lower extremity edema  R60.0 Compression Sleeve/Stocking Order for DME - ONLY FOR DME      3. Anxiety  F41.9 escitalopram (LEXAPRO) 10 MG tablet     Adult Mental Health  Referral      4. Major depressive disorder, single episode, moderate (H)  F32.1 escitalopram (LEXAPRO) 10 MG tablet     Adult Mental Health  Referral        Healthy female exam completed today. Discussed lifestyle modifications including weight loss/ management, eating a balanced diet, and getting regular cardiovascular exercise. Discussed self breast exams and how to complete these. Pap Smear reviewed/ up to date. Labs reviewed previous. Plan yearly annual physicals or sooner as needed.    Mental health is currently impacted by general life situations and baseline anxiety is present. Will start lexapro and referral placed to mental health.  Follow up in 4 weeks for medication recheck.     Patient has been advised of split billing requirements and indicates understanding: Yes      COUNSELING:  Reviewed preventive health counseling, as reflected in patient instructions       Regular exercise       Healthy diet/nutrition    Estimated body mass index is 37.91 kg/m  as calculated from the following:    Height as of this encounter: 1.6 m (5' 3\").    Weight as of this encounter: 97.1 kg (214 lb).    Weight management plan: Discussed healthy diet and exercise guidelines    She reports that she quit smoking about 13 years ago. Her smoking use included cigarettes. She has a 0.75 pack-year smoking history. She has never used smokeless tobacco.      Counseling Resources:  ATP IV " Guidelines  Pooled Cohorts Equation Calculator  Breast Cancer Risk Calculator  BRCA-Related Cancer Risk Assessment: FHS-7 Tool  FRAX Risk Assessment  ICSI Preventive Guidelines  Dietary Guidelines for Americans, 2010  USDA's MyPlate  ASA Prophylaxis  Lung CA Screening    OTILIA Mary CNP  [facility]

## 2022-11-02 ENCOUNTER — HOSPITAL ENCOUNTER (OUTPATIENT)
Dept: ULTRASOUND IMAGING | Facility: CLINIC | Age: 37
Discharge: HOME | End: 2022-11-02
Attending: NURSE PRACTITIONER | Admitting: NURSE PRACTITIONER
Payer: COMMERCIAL

## 2022-11-02 DIAGNOSIS — R60.0 LOWER EXTREMITY EDEMA: ICD-10-CM

## 2022-11-02 PROCEDURE — 93970 EXTREMITY STUDY: CPT

## 2022-11-19 ENCOUNTER — HEALTH MAINTENANCE LETTER (OUTPATIENT)
Age: 37
End: 2022-11-19

## 2022-12-16 ENCOUNTER — OFFICE VISIT (OUTPATIENT)
Dept: DERMATOLOGY | Facility: CLINIC | Age: 37
End: 2022-12-16
Payer: COMMERCIAL

## 2022-12-16 DIAGNOSIS — L70.0 ACNE VULGARIS: Primary | ICD-10-CM

## 2022-12-16 DIAGNOSIS — L98.9 SKIN DISORDER: ICD-10-CM

## 2022-12-16 DIAGNOSIS — L73.9 FOLLICULITIS: ICD-10-CM

## 2022-12-16 PROCEDURE — 99203 OFFICE O/P NEW LOW 30 MIN: CPT | Performed by: PHYSICIAN ASSISTANT

## 2022-12-16 RX ORDER — CLINDAMYCIN PHOSPHATE 10 UG/ML
LOTION TOPICAL
Qty: 60 ML | Refills: 5 | Status: SHIPPED | OUTPATIENT
Start: 2022-12-16 | End: 2024-06-24

## 2022-12-16 ASSESSMENT — PAIN SCALES - GENERAL: PAINLEVEL: NO PAIN (0)

## 2022-12-16 NOTE — LETTER
12/16/2022         RE: Lina Butterfield  [id number]   [place] [address]        Dear Colleague,    Thank you for referring your patient, Lina Butterfield, to the [facility]. Please see a copy of my visit note below.    Lina Butterfield is an extremely pleasant 37 year old year old female patient here today for acne and folliculitis. She notes that she gets pimple like breakouts legs, axilla. She also notes she will get acne and more hair growth on chin. She stopped spironolactone due to leg swelling. She has been using bactroban ointment on areas that looked infected. She also notes she is doing electrolysis for hair on chin.   Patient has no other skin complaints today.  Remainder of the HPI, Meds, PMH, Allergies, FH, and SH was reviewed in chart.   Past Medical History:   Diagnosis Date     Other staphylococcus infection in conditions classified elsewhere and of unspecified site      Supervision of normal first pregnancy 7/24/2009     Tobacco use disorder      Unspecified otitis media        Past Surgical History:   Procedure Laterality Date     NO HISTORY OF SURGERY          Family History   Problem Relation Age of Onset     Allergies Mother      Obesity Mother      Allergies Brother      Gastrointestinal Disease Brother         crohn's     Allergies Sister      Diabetes Paternal Grandmother      Obesity Paternal Grandmother      Diabetes Paternal Grandfather      Obesity Paternal Grandfather      Obesity Maternal Grandmother      Obesity Maternal Grandfather      Asthma No family hx of      C.A.D. No family hx of      Hypertension No family hx of      Cerebrovascular Disease No family hx of      Breast Cancer No family hx of      Cancer - colorectal No family hx of      Prostate Cancer No family hx of        Social History     Socioeconomic History     Marital status:      Spouse name: Not on file     Number of children: 0     Years of education: 13     Highest education level:  Not on file   Occupational History     Employer: UNEMPLOYED   Tobacco Use     Smoking status: Former     Packs/day: 0.25     Years: 3.00     Pack years: 0.75     Types: Cigarettes     Quit date: 2009     Years since quittin.3     Smokeless tobacco: Never   Vaping Use     Vaping Use: Never used   Substance and Sexual Activity     Alcohol use: Yes     Comment: occ. social     Drug use: No     Sexual activity: Yes     Partners: Male   Other Topics Concern     Parent/sibling w/ CABG, MI or angioplasty before 65F 55M? No   Social History Narrative    Works as , currently unemployed.       Social Determinants of Health     Financial Resource Strain: Not on file   Food Insecurity: Not on file   Transportation Needs: Not on file   Physical Activity: Not on file   Stress: Not on file   Social Connections: Not on file   Intimate Partner Violence: Not on file   Housing Stability: Not on file       Outpatient Encounter Medications as of 2022   Medication Sig Dispense Refill     clindamycin (CLEOCIN T) 1 % external lotion Apply twice daily to face and body. 60 mL 5     COMPOUNDED NON-CONTROLLED SUBSTANCE (CMPD RX) - PHARMACY TO MIX COMPOUNDED MEDICATION Dispense: spironolactone 5% cream: apply twice daily to face. 30 g 5     escitalopram (LEXAPRO) 10 MG tablet Take 1 tablet (10 mg) by mouth daily 30 tablet 1     furosemide (LASIX) 20 MG tablet Take 1 tablet (20 mg) by mouth as needed (Take one tablet daily for leg swelling) 30 tablet 1     hydrOXYzine (ATARAX) 25 MG tablet Take 25 mg by mouth 3 times daily as needed for itching       mupirocin (BACTROBAN) 2 % external ointment Apply topically 3 times daily 30 g 0     No facility-administered encounter medications on file as of 2022.             O:   NAD, WDWN, Alert & Oriented, Mood & Affect wnl, Vitals stable   Here today alone   There were no vitals taken for this visit.   General appearance normal   Vitals stable   Alert, oriented and in no  acute distress     Few inflammatory papules on thighs, axilla, lower half of face, buttock      Eyes: Conjunctivae/lids:Normal     ENT: Lips: normal    MSK:Normal    Cardiovascular: peripheral edema none    Pulm: Breathing Normal    Neuro/Psych: Orientation:Alert and Orientedx3 ; Mood/Affect:normal   A/P:  1. Acne/Folliculitis   Recommend hibiclens wash to the affected areas.   Apply clindamycin lotion twice daily as needed.  Recommend using spironolactone on lower half of face once to twice daily.       Again, thank you for allowing me to participate in the care of your patient.        Sincerely,        Hollie Lizama PA-C

## 2022-12-18 NOTE — PROGRESS NOTES
Lina Butterfield is an extremely pleasant 37 year old year old female patient here today for acne and folliculitis. She notes that she gets pimple like breakouts legs, axilla. She also notes she will get acne and more hair growth on chin. She stopped spironolactone due to leg swelling. She has been using bactroban ointment on areas that looked infected. She also notes she is doing electrolysis for hair on chin.   Patient has no other skin complaints today.  Remainder of the HPI, Meds, PMH, Allergies, FH, and SH was reviewed in chart.   Past Medical History:   Diagnosis Date     Other staphylococcus infection in conditions classified elsewhere and of unspecified site      Supervision of normal first pregnancy 2009     Tobacco use disorder      Unspecified otitis media        Past Surgical History:   Procedure Laterality Date     NO HISTORY OF SURGERY          Family History   Problem Relation Age of Onset     Allergies Mother      Obesity Mother      Allergies Brother      Gastrointestinal Disease Brother         crohn's     Allergies Sister      Diabetes Paternal Grandmother      Obesity Paternal Grandmother      Diabetes Paternal Grandfather      Obesity Paternal Grandfather      Obesity Maternal Grandmother      Obesity Maternal Grandfather      Asthma No family hx of      C.A.D. No family hx of      Hypertension No family hx of      Cerebrovascular Disease No family hx of      Breast Cancer No family hx of      Cancer - colorectal No family hx of      Prostate Cancer No family hx of        Social History     Socioeconomic History     Marital status:      Spouse name: Not on file     Number of children: 0     Years of education: 13     Highest education level: Not on file   Occupational History     Employer: UNEMPLOYED   Tobacco Use     Smoking status: Former     Packs/day: 0.25     Years: 3.00     Pack years: 0.75     Types: Cigarettes     Quit date: 2009     Years since quittin.3      Smokeless tobacco: Never   Vaping Use     Vaping Use: Never used   Substance and Sexual Activity     Alcohol use: Yes     Comment: occ. social     Drug use: No     Sexual activity: Yes     Partners: Male   Other Topics Concern     Parent/sibling w/ CABG, MI or angioplasty before 65F 55M? No   Social History Narrative    Works as , currently unemployed.       Social Determinants of Health     Financial Resource Strain: Not on file   Food Insecurity: Not on file   Transportation Needs: Not on file   Physical Activity: Not on file   Stress: Not on file   Social Connections: Not on file   Intimate Partner Violence: Not on file   Housing Stability: Not on file       Outpatient Encounter Medications as of 12/16/2022   Medication Sig Dispense Refill     clindamycin (CLEOCIN T) 1 % external lotion Apply twice daily to face and body. 60 mL 5     COMPOUNDED NON-CONTROLLED SUBSTANCE (CMPD RX) - PHARMACY TO MIX COMPOUNDED MEDICATION Dispense: spironolactone 5% cream: apply twice daily to face. 30 g 5     escitalopram (LEXAPRO) 10 MG tablet Take 1 tablet (10 mg) by mouth daily 30 tablet 1     furosemide (LASIX) 20 MG tablet Take 1 tablet (20 mg) by mouth as needed (Take one tablet daily for leg swelling) 30 tablet 1     hydrOXYzine (ATARAX) 25 MG tablet Take 25 mg by mouth 3 times daily as needed for itching       mupirocin (BACTROBAN) 2 % external ointment Apply topically 3 times daily 30 g 0     No facility-administered encounter medications on file as of 12/16/2022.             O:   NAD, WDWN, Alert & Oriented, Mood & Affect wnl, Vitals stable   Here today alone   There were no vitals taken for this visit.   General appearance normal   Vitals stable   Alert, oriented and in no acute distress     Few inflammatory papules on thighs, axilla, lower half of face, buttock      Eyes: Conjunctivae/lids:Normal     ENT: Lips: normal    MSK:Normal    Cardiovascular: peripheral edema none    Pulm: Breathing  Normal    Neuro/Psych: Orientation:Alert and Orientedx3 ; Mood/Affect:normal   A/P:  1. Acne/Folliculitis   Recommend hibiclens wash to the affected areas.   Apply clindamycin lotion twice daily as needed.  Recommend using spironolactone on lower half of face once to twice daily.

## 2023-11-02 ENCOUNTER — VIRTUAL VISIT (OUTPATIENT)
Dept: FAMILY MEDICINE | Facility: CLINIC | Age: 38
End: 2023-11-02
Payer: COMMERCIAL

## 2023-11-02 DIAGNOSIS — J02.9 ACUTE PHARYNGITIS, UNSPECIFIED ETIOLOGY: Primary | ICD-10-CM

## 2023-11-02 PROCEDURE — 99213 OFFICE O/P EST LOW 20 MIN: CPT | Mod: VID | Performed by: FAMILY MEDICINE

## 2023-11-02 ASSESSMENT — PATIENT HEALTH QUESTIONNAIRE - PHQ9
10. IF YOU CHECKED OFF ANY PROBLEMS, HOW DIFFICULT HAVE THESE PROBLEMS MADE IT FOR YOU TO DO YOUR WORK, TAKE CARE OF THINGS AT HOME, OR GET ALONG WITH OTHER PEOPLE: SOMEWHAT DIFFICULT
SUM OF ALL RESPONSES TO PHQ QUESTIONS 1-9: 4
SUM OF ALL RESPONSES TO PHQ QUESTIONS 1-9: 4

## 2023-11-02 ASSESSMENT — ENCOUNTER SYMPTOMS: SORE THROAT: 1

## 2023-11-02 NOTE — COMMUNITY RESOURCES LIST (ENGLISH)
11/02/2023   [facility] - Outpatient Clinics  N/A  For additional resource needs, please contact your health insurance member services or your primary care team.  Phone: [phone]   Email: N/A   Address: [address] [place] [address]   Hours: N/A        Financial Stability       Utility payment assistance  1  [place] [employer] - [place]'s Telephone Assistance Plan (TAP) and Federal Lifeline and Affordable Connectivity Program (ACP) Distance: 25.25 miles      Phone/Virtual   [address] [place] [address]  Language: English  Fees: Free   Phone: (572) 392-1716 Email: consumer.puc@[facility].mn. Website: https://[place].gov/puc/consumers/telephone/     2  [place] [employer] - [employer] and Utilities Distance: 27.34 miles      In-Person, Phone/Virtual   [address] [place] [address]  Language: English  Hours: Mon - Fri 8:30 AM - 4:30 PM  Fees: Free   Phone: (907) 898-3086 Website: https://[place]/Note[place]/energy/consumer-assistance/energy-assistance-program/          Important Numbers & Websites       [facility]   211 211itedway.org  Poison Control   (727) 295-2767 [place]poison.[place]  Suicide and Crisis Lifeline   [address].org  Childhelp [place] Child Abuse Hotline   473.140.5102 Childhelphotline.org  National Sexual Assault Hotline   (666) 701-1197 (HOPE) Rainn.org  National Runaway Safeline   (145) 208-6634 (RUNAWAY) 1800runaway.org  Pregnancy & Postpartum Support [place]   Call/text 800-571-8131 Ppsupport[place].org  Substance Abuse National Helpline ([facility]   209-856-HELP (6790) Findtreatment.gov  Emergency Services   911

## 2023-11-02 NOTE — PROGRESS NOTES
Lina is a 38 year old who is being evaluated via a billable video visit.      How would you like to obtain your AVS? MyChart  If the video visit is dropped, the invitation should be resent by: Text to cell phone: [phone]  Will anyone else be joining your video visit? No          Assessment & Plan     Acute pharyngitis, unspecified etiology  39yo female with 7 days of sore throat, seen by video visit today. Well-appearing on video exam, not able to appreciate tonsillar exudates on video today. Differential includes strep, COVID, or other viral pharyngitis. Reviewed need for testing; unfortunately soonest available appointment with MA is tomorrow at 9am and she agrees to come in for this. Verified allergies are only erythromycin, preferred pharmacy is [facility] in [place].  - Streptococcus A Rapid Screen w/Reflex to PCR - Clinic Collect  - Symptomatic COVID-19 Virus (Coronavirus) by PCR  - ibuprofen, tylenol prn pain; saltwater gargles                   Hien Morris MD  [facility]    Subjective   Lina is a 38 year old, presenting for the following health issues:  Pharyngitis      11/2/2023     2:38 PM   Additional Questions   Roomed by stacy sheppard       Pharyngitis     History of Present Illness       Reason for visit:  Sore throat  Symptom onset:  3-7 days ago       Has a very sore throat, tonsils are red and puffy, lymph nodes are swollen  A few days ago noted yellow on back of her throat  Back of throat hurts and makes her want to cough  Taking allergy medication and decongestants   No fever  No one sick at home  Not sure of where she would have picked it up  Tickle in throat on Friday, on Saturday saw the yellow on tonsils, has continued to worsen            Review of Systems   HENT:  Positive for sore throat.       Constitutional, HEENT, cardiovascular, pulmonary, gi and gu systems are negative, except as otherwise noted.      Objective    Vitals - Patient  Reported  Temperature (Patient Reported): 98.1  F (36.7  C)        Physical Exam   GENERAL: Healthy, alert and no distress  EYES: Eyes grossly normal to inspection.  No discharge or erythema, or obvious scleral/conjunctival abnormalities.  HENT: normal cephalic/atraumatic, oropharynx clear, oral mucous membranes moist, tonsillar erythema, and no tonsillar exudate appreciated via video visit  RESP: No audible wheeze, cough, or visible cyanosis.  No visible retractions or increased work of breathing.    SKIN: Visible skin clear. No significant rash, abnormal pigmentation or lesions.  NEURO: Cranial nerves grossly intact.  Mentation and speech appropriate for age.  PSYCH: Mentation appears normal, affect normal/bright, judgement and insight intact, normal speech and appearance well-groomed.                Video-Visit Details    Type of service:  Video Visit     Originating Location (pt. Location): Home    Distant Location (provider location):  On-site  Platform used for Video Visit: Jonn

## 2023-11-03 ENCOUNTER — LAB (OUTPATIENT)
Dept: LAB | Facility: CLINIC | Age: 38
End: 2023-11-03
Payer: COMMERCIAL

## 2023-11-03 DIAGNOSIS — J02.9 ACUTE PHARYNGITIS, UNSPECIFIED ETIOLOGY: ICD-10-CM

## 2023-11-03 LAB
DEPRECATED S PYO AG THROAT QL EIA: NEGATIVE
GROUP A STREP BY PCR: NOT DETECTED

## 2023-11-03 PROCEDURE — 87651 STREP A DNA AMP PROBE: CPT

## 2023-11-03 PROCEDURE — 87635 SARS-COV-2 COVID-19 AMP PRB: CPT

## 2023-11-04 LAB — SARS-COV-2 RNA RESP QL NAA+PROBE: NEGATIVE

## 2024-01-28 ENCOUNTER — HEALTH MAINTENANCE LETTER (OUTPATIENT)
Age: 39
End: 2024-01-28

## 2024-06-24 ENCOUNTER — VIRTUAL VISIT (OUTPATIENT)
Dept: INTERNAL MEDICINE | Facility: CLINIC | Age: 39
End: 2024-06-24
Payer: COMMERCIAL

## 2024-06-24 DIAGNOSIS — H10.32 ACUTE BACTERIAL CONJUNCTIVITIS OF LEFT EYE: Primary | ICD-10-CM

## 2024-06-24 DIAGNOSIS — L98.9 SKIN DISORDER: ICD-10-CM

## 2024-06-24 DIAGNOSIS — L73.9 FOLLICULITIS: ICD-10-CM

## 2024-06-24 PROCEDURE — 99213 OFFICE O/P EST LOW 20 MIN: CPT | Mod: 95 | Performed by: PHYSICIAN ASSISTANT

## 2024-06-24 RX ORDER — CLINDAMYCIN PHOSPHATE 10 UG/ML
LOTION TOPICAL
Qty: 60 ML | Refills: 0 | Status: SHIPPED | OUTPATIENT
Start: 2024-06-24

## 2024-06-24 RX ORDER — MUPIROCIN 20 MG/G
OINTMENT TOPICAL 3 TIMES DAILY
Qty: 30 G | Refills: 0 | Status: SHIPPED | OUTPATIENT
Start: 2024-06-24

## 2024-06-24 RX ORDER — POLYMYXIN B SULFATE AND TRIMETHOPRIM 1; 10000 MG/ML; [USP'U]/ML
1-2 SOLUTION OPHTHALMIC EVERY 4 HOURS
Qty: 10 ML | Refills: 0 | Status: SHIPPED | OUTPATIENT
Start: 2024-06-24 | End: 2024-06-28

## 2024-06-24 ASSESSMENT — ENCOUNTER SYMPTOMS: EYE PAIN: 1

## 2024-06-24 ASSESSMENT — PATIENT HEALTH QUESTIONNAIRE - PHQ9
SUM OF ALL RESPONSES TO PHQ QUESTIONS 1-9: 3
SUM OF ALL RESPONSES TO PHQ QUESTIONS 1-9: 3
10. IF YOU CHECKED OFF ANY PROBLEMS, HOW DIFFICULT HAVE THESE PROBLEMS MADE IT FOR YOU TO DO YOUR WORK, TAKE CARE OF THINGS AT HOME, OR GET ALONG WITH OTHER PEOPLE: SOMEWHAT DIFFICULT

## 2024-06-24 NOTE — PROGRESS NOTES
Lina is a 38 year old who is being evaluated via a billable video visit.    How would you like to obtain your AVS? Mail a copy  If the video visit is dropped, the invitation should be resent by: Text to cell phone: [phone]  Will anyone else be joining your video visit? No      Assessment & Plan     Acute bacterial conjunctivitis of left eye  Thick drainage concerning for bacterial involvement. Start Polytrim drops. Can continue with antihistamine eye drops to help with discomfort. Advise in-person evaluation with worsening symptoms, including fever or vision change.  - polymixin b-trimethoprim (POLYTRIM) 06934-9.1 UNIT/ML-% ophthalmic solution; Place 1-2 drops Into the left eye every 4 hours for 7 days    Skin disorder  One time refill of medication. See PCP for add'l refills.  - mupirocin (BACTROBAN) 2 % external ointment; Apply topically 3 times daily    Folliculitis  One time refill of medication. See PCP for add'l refills.  - mupirocin (BACTROBAN) 2 % external ointment; Apply topically 3 times daily  - clindamycin (CLEOCIN T) 1 % external lotion; Apply twice daily to face and body.    Subjective   Lina is a 38 year old, presenting for the following health issues:  Eye Problem (Patient complains of eye infection for 3 days.)      6/24/2024    12:25 PM   Additional Questions   Roomed by Rachele Roque     Eye Problem          Eye(s) Problem  Onset/Duration: Friday (3 days ago), worsened in evening. Better after icing. Some itching on Saturay. Sunday: some drainage, itching and irritation. Worse today  Description:   Location: Left  Pain: Yes, irritated  Redness: YES  Accompanying Signs & Symptoms:  Discharge/mattering: Green, thick  Swelling: YES  Visual changes: No  Fever: No  Nasal Congestion: No  Bothered by bright lights: Slight  History:  Trauma: No  Foreign body exposure: Animal hair  Wearing contacts: No  Precipitating or alleviating factors: Maybe started with exposure to animal hair. But drainage is  becoming thicker.  Therapies tried and outcome: Icing. Allergy eye drops.    Past Medical History:   Diagnosis Date    Other staphylococcus infection in conditions classified elsewhere and of unspecified site     Supervision of normal first pregnancy 7/24/2009    Tobacco use disorder     Unspecified otitis media      She is requesting a refill on two of her topical derm medications. Has a history of recurrent folliculitis. Tends to flare when she is around her mom, who is a staph carrier. Looking for a refill on topical clindamycin and topical mupirocin. Also has an Rx for topical spironolactone, but this comes from a compounding pharmacy.      Review of Systems  Constitutional, HEENT, cardiovascular, pulmonary, gi and gu systems are negative, except as otherwise noted.      Objective    Vitals - Patient Reported  Pain Score: No Pain (0)        Physical Exam   GENERAL: alert and no distress  EYES: EOMI and conjunctiva/corneas- conjunctival injection OS and green colored discharge present left  SKIN: Facial lesions  PSYCH: Appropriate affect, tone, and pace of words          Video-Visit Details    Type of service:  Video Visit   Originating Location (pt. Location): Other Car    Distant Location (provider location):  On-site  Platform used for Video Visit: Jonn  Signed Electronically by: Cate Duffy PA-C    Answers submitted by the patient for this visit:  Patient Health Questionnaire (Submitted on 6/24/2024)  If you checked off any problems, how difficult have these problems made it for you to do your work, take care of things at home, or get along with other people?: Somewhat difficult  PHQ9 TOTAL SCORE: 3

## 2024-06-28 ENCOUNTER — MYC REFILL (OUTPATIENT)
Dept: INTERNAL MEDICINE | Facility: CLINIC | Age: 39
End: 2024-06-28
Payer: COMMERCIAL

## 2024-06-28 DIAGNOSIS — H10.32 ACUTE BACTERIAL CONJUNCTIVITIS OF LEFT EYE: ICD-10-CM

## 2024-06-28 RX ORDER — POLYMYXIN B SULFATE AND TRIMETHOPRIM 1; 10000 MG/ML; [USP'U]/ML
1-2 SOLUTION OPHTHALMIC EVERY 4 HOURS
Qty: 10 ML | Refills: 0 | Status: SHIPPED | OUTPATIENT
Start: 2024-06-28

## 2024-06-28 NOTE — TELEPHONE ENCOUNTER
Patient calls the clinic, she lost the polytrim and needs another script sent in, this was sent by [place] provider Cate Duffy, this RN is not authorized to send for this provider so will route to this provider to resend, the patient will also call the [place] clinic to see if this can be expedited.      Routing to DOUGLAS Flynn.      LOIS Servin

## 2024-06-28 NOTE — TELEPHONE ENCOUNTER
Please advise sending another polytrim rx to [facility] in [place]. Patient lost original prescription sent 6/24/24. Pharmacy desired attached.     Routing to available providers currently in clinic.

## 2024-07-09 RX ORDER — POLYMYXIN B SULFATE AND TRIMETHOPRIM 1; 10000 MG/ML; [USP'U]/ML
1-2 SOLUTION OPHTHALMIC EVERY 4 HOURS
Qty: 10 ML | Refills: 0 | OUTPATIENT
Start: 2024-07-09

## 2025-02-01 ENCOUNTER — HEALTH MAINTENANCE LETTER (OUTPATIENT)
Age: 40
End: 2025-02-01

## 2025-03-11 ENCOUNTER — OFFICE VISIT (OUTPATIENT)
Dept: FAMILY MEDICINE | Facility: CLINIC | Age: 40
End: 2025-03-11
Payer: COMMERCIAL

## 2025-03-11 VITALS
SYSTOLIC BLOOD PRESSURE: 143 MMHG | DIASTOLIC BLOOD PRESSURE: 99 MMHG | BODY MASS INDEX: 35.79 KG/M2 | WEIGHT: 202 LBS | HEIGHT: 63 IN | RESPIRATION RATE: 16 BRPM | OXYGEN SATURATION: 98 % | HEART RATE: 100 BPM | TEMPERATURE: 98.1 F

## 2025-03-11 DIAGNOSIS — H57.89 RED EYE: Primary | ICD-10-CM

## 2025-03-11 PROCEDURE — 3077F SYST BP >= 140 MM HG: CPT | Performed by: FAMILY MEDICINE

## 2025-03-11 PROCEDURE — 99213 OFFICE O/P EST LOW 20 MIN: CPT | Performed by: FAMILY MEDICINE

## 2025-03-11 PROCEDURE — 3080F DIAST BP >= 90 MM HG: CPT | Performed by: FAMILY MEDICINE

## 2025-03-11 ASSESSMENT — PATIENT HEALTH QUESTIONNAIRE - PHQ9
SUM OF ALL RESPONSES TO PHQ QUESTIONS 1-9: 7
SUM OF ALL RESPONSES TO PHQ QUESTIONS 1-9: 7
10. IF YOU CHECKED OFF ANY PROBLEMS, HOW DIFFICULT HAVE THESE PROBLEMS MADE IT FOR YOU TO DO YOUR WORK, TAKE CARE OF THINGS AT HOME, OR GET ALONG WITH OTHER PEOPLE: SOMEWHAT DIFFICULT

## 2025-03-11 NOTE — PROGRESS NOTES
Assessment & Plan  Red eye  Patient for the first time today.      She comes in because of 9 months of Bilateral irritation, redness, irritation, pressure, foreign body sensation in both eyes on and off.     Antibiotic drops helped a little but did not take the problem away.   She states that over the last two weeks the problem has worsened over the past 2 months.  She says both eyes are affected and currently the left eye is significantly worse than the right eye.  She has drainage and sometimes has a sensation that there is a foreign body plastic trunk under her eyelid, and this goes back and forth between her eyes sometimes affecting the right eye and other times affecting the left eye.  In the last 2 weeks she is also noted sensitivity to lights and is wearing sunglasses to her visit today.  She is also noticing her vision is worsening and is blurry right now.    On exam she has a mildly erythematous swollen left upper eyelid that seems dry and scaly and there is ptosis of that lid.  Left eye itself appears severely injected, dry and irritated and there is millimeter area of drainage in the medial canthus of the left eye.  The left eye appears painful and extremely uncomfortable    Currently the right eye looks significantly better than the left eye however it is also injected and irritated and dry appearing.    Patient facial skin also reveals about 10 erythematous 3-5mm dry appearing macules, consistent with chronic picking/ healing skin. 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    I am concerned about her vision and so I have recommended that she be seen immediately by ophthalmology.  She says she will [facility] immediately after this visit today.      She has not responded to antibiotic eyedrops and this problem has been ongoing and is now  "worsening and affecting her vision-this is out of my area of expertise.  A referral to ophthalmology is placed and I recommend she be seen today.    Orders:    Adult Eye  Referral; Future         Subjective   Lina is a 39 year old, presenting for the following health issues:  eye are red and blurry feeling      3/11/2025     2:42 PM   Additional Questions   Roomed by as   Accompanied by self         3/11/2025     2:42 PM   Patient Reported Additional Medications   Patient reports taking the following new medications no     History of Present Illness       Reason for visit:  Eyes    She eats 2-3 servings of fruits and vegetables daily.She consumes 1 sweetened beverage(s) daily.She exercises with enough effort to increase her heart rate 30 to 60 minutes per day.  She exercises with enough effort to increase her heart rate 4 days per week. She is missing 1 dose(s) of medications per week.  She is not taking prescribed medications regularly due to other.            Objective    BP (!) 143/99 (BP Location: Left arm, Patient Position: Left side, Cuff Size: Adult Large)   Pulse 100   Temp 98.1  F (36.7  C) (Oral)   Resp 16   Ht 1.6 m (5' 3\")   Wt 91.6 kg (202 lb)   LMP 02/25/2025 (Approximate)   SpO2 98%   Breastfeeding No   BMI 35.78 kg/m    Body mass index is 35.78 kg/m .  Physical Exam  Constitutional:       Appearance: She is obese.      Comments: Poorly groomed 39 year old female   HENT:      Head: Normocephalic and atraumatic.      Right Ear: Tympanic membrane, ear canal and external ear normal.      Left Ear: Tympanic membrane, ear canal and external ear normal.      Mouth/Throat:      Mouth: Mucous membranes are moist.      Pharynx: Oropharynx is clear.   Eyes:      General: Lids are everted, no foreign bodies appreciated. No allergic shiner.        Right eye: No foreign body, discharge or hordeolum.         Left eye: Discharge present.No foreign body or hordeolum.      Extraocular Movements:     "  Right eye: Normal extraocular motion.      Left eye: Normal extraocular motion.      Conjunctiva/sclera:      Right eye: Right conjunctiva is injected.      Left eye: Left conjunctiva is injected (severe). Exudate present.   Neurological:      Mental Status: She is alert.                Signed Electronically by: Ninfa Olivares MD

## 2025-03-11 NOTE — ASSESSMENT & PLAN NOTE
Patient for the first time today.      She comes in because of 9 months of Bilateral irritation, redness, irritation, pressure, foreign body sensation in both eyes on and off.     Antibiotic drops helped a little but did not take the problem away.   She states that over the last two weeks the problem has worsened over the past 2 months.  She says both eyes are affected and currently the left eye is significantly worse than the right eye.  She has drainage and sometimes has a sensation that there is a foreign body plastic trunk under her eyelid, and this goes back and forth between her eyes sometimes affecting the right eye and other times affecting the left eye.  In the last 2 weeks she is also noted sensitivity to lights and is wearing sunglasses to her visit today.  She is also noticing her vision is worsening and is blurry right now.    On exam she has a mildly erythematous swollen left upper eyelid that seems dry and scaly and there is ptosis of that lid.  Left eye itself appears severely injected, dry and irritated and there is millimeter area of drainage in the medial canthus of the left eye.  The left eye appears painful and extremely uncomfortable    Currently the right eye looks significantly better than the left eye however it is also injected and irritated and dry appearing.    Patient facial skin also reveals about 10 erythematous 3-5mm dry appearing macules, consistent with chronic picking/ healing skin. 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    I am concerned about her vision and so I have recommended that she be seen immediately by ophthalmology.  She says she will [facility] after this visit today.      She has not responded to antibiotic eyedrops and this problem has been ongoing and is now worsening and affecting her  vision-this is out of my area of expertise.  A referral to ophthalmology is placed and I recommend she be seen today.    Orders:    Adult Eye  Referral; Future

## 2025-08-15 ENCOUNTER — HOSPITAL ENCOUNTER (EMERGENCY)
Facility: CLINIC | Age: 40
Discharge: HOME | End: 2025-08-15
Attending: EMERGENCY MEDICINE | Admitting: EMERGENCY MEDICINE
Payer: COMMERCIAL

## 2025-08-15 VITALS
WEIGHT: 200 LBS | DIASTOLIC BLOOD PRESSURE: 82 MMHG | TEMPERATURE: 98 F | BODY MASS INDEX: 35.43 KG/M2 | RESPIRATION RATE: 18 BRPM | SYSTOLIC BLOOD PRESSURE: 139 MMHG | HEART RATE: 92 BPM | OXYGEN SATURATION: 99 %

## 2025-08-15 DIAGNOSIS — L98.9 SKIN LESION: ICD-10-CM

## 2025-08-15 DIAGNOSIS — H93.8X2 EAR SWELLING, LEFT: Primary | ICD-10-CM

## 2025-08-15 DIAGNOSIS — L73.9 FOLLICULITIS: ICD-10-CM

## 2025-08-15 PROCEDURE — 99283 EMERGENCY DEPT VISIT LOW MDM: CPT | Performed by: EMERGENCY MEDICINE

## 2025-08-15 RX ORDER — CLINDAMYCIN PHOSPHATE 10 UG/ML
LOTION TOPICAL
Qty: 60 ML | Refills: 0 | Status: SHIPPED | OUTPATIENT
Start: 2025-08-15

## 2025-08-15 ASSESSMENT — ENCOUNTER SYMPTOMS
FEVER: 0
WOUND: 1
FATIGUE: 0
APPETITE CHANGE: 0

## 2025-08-15 ASSESSMENT — COLUMBIA-SUICIDE SEVERITY RATING SCALE - C-SSRS
2. HAVE YOU ACTUALLY HAD ANY THOUGHTS OF KILLING YOURSELF IN THE PAST MONTH?: NO
6. HAVE YOU EVER DONE ANYTHING, STARTED TO DO ANYTHING, OR PREPARED TO DO ANYTHING TO END YOUR LIFE?: NO
1. IN THE PAST MONTH, HAVE YOU WISHED YOU WERE DEAD OR WISHED YOU COULD GO TO SLEEP AND NOT WAKE UP?: NO

## 2025-08-15 ASSESSMENT — ACTIVITIES OF DAILY LIVING (ADL): ADLS_ACUITY_SCORE: 41

## 2025-08-18 ENCOUNTER — OFFICE VISIT (OUTPATIENT)
Dept: FAMILY MEDICINE | Facility: CLINIC | Age: 40
End: 2025-08-18
Payer: COMMERCIAL

## 2025-08-18 VITALS
BODY MASS INDEX: 35.47 KG/M2 | HEIGHT: 63 IN | TEMPERATURE: 99.8 F | RESPIRATION RATE: 18 BRPM | DIASTOLIC BLOOD PRESSURE: 82 MMHG | HEART RATE: 94 BPM | OXYGEN SATURATION: 95 % | WEIGHT: 200.2 LBS | SYSTOLIC BLOOD PRESSURE: 126 MMHG

## 2025-08-18 DIAGNOSIS — H04.123 DRY EYES: ICD-10-CM

## 2025-08-18 DIAGNOSIS — H93.8X2 EAR SWELLING, LEFT: ICD-10-CM

## 2025-08-18 DIAGNOSIS — Z30.011 ENCOUNTER FOR INITIAL PRESCRIPTION OF CONTRACEPTIVE PILLS: ICD-10-CM

## 2025-08-18 DIAGNOSIS — Z11.59 NEED FOR HEPATITIS C SCREENING TEST: ICD-10-CM

## 2025-08-18 DIAGNOSIS — E78.1 HYPERTRIGLYCERIDEMIA: ICD-10-CM

## 2025-08-18 DIAGNOSIS — Z00.00 ROUTINE GENERAL MEDICAL EXAMINATION AT A HEALTH CARE FACILITY: Primary | ICD-10-CM

## 2025-08-18 DIAGNOSIS — L08.9 RECURRENT INFECTION OF SKIN: ICD-10-CM

## 2025-08-18 DIAGNOSIS — F32.A DEPRESSION, UNSPECIFIED DEPRESSION TYPE: ICD-10-CM

## 2025-08-18 LAB
ALBUMIN SERPL BCG-MCNC: 4.3 G/DL (ref 3.5–5.2)
ALP SERPL-CCNC: 90 U/L (ref 40–150)
ALT SERPL W P-5'-P-CCNC: 21 U/L (ref 0–50)
ANION GAP SERPL CALCULATED.3IONS-SCNC: 12 MMOL/L (ref 7–15)
AST SERPL W P-5'-P-CCNC: 24 U/L (ref 0–45)
BILIRUB SERPL-MCNC: 0.6 MG/DL
BUN SERPL-MCNC: 8.4 MG/DL (ref 6–20)
CALCIUM SERPL-MCNC: 8.9 MG/DL (ref 8.8–10.4)
CHLORIDE SERPL-SCNC: 100 MMOL/L (ref 98–107)
CHOLEST SERPL-MCNC: 159 MG/DL
CREAT SERPL-MCNC: 0.79 MG/DL (ref 0.51–0.95)
CRP SERPL-MCNC: <3 MG/L
EGFRCR SERPLBLD CKD-EPI 2021: >90 ML/MIN/1.73M2
ERYTHROCYTE [DISTWIDTH] IN BLOOD BY AUTOMATED COUNT: 12.3 % (ref 10–15)
ERYTHROCYTE [SEDIMENTATION RATE] IN BLOOD BY WESTERGREN METHOD: 7 MM/HR (ref 0–20)
FASTING STATUS PATIENT QL REPORTED: YES
FASTING STATUS PATIENT QL REPORTED: YES
GLUCOSE SERPL-MCNC: 86 MG/DL (ref 70–99)
HCO3 SERPL-SCNC: 24 MMOL/L (ref 22–29)
HCT VFR BLD AUTO: 49.1 % (ref 35–47)
HCV AB SERPL QL IA: NONREACTIVE
HDLC SERPL-MCNC: 43 MG/DL
HGB BLD-MCNC: 16.1 G/DL (ref 11.7–15.7)
LDLC SERPL CALC-MCNC: 78 MG/DL
MCH RBC QN AUTO: 28.5 PG (ref 26.5–33)
MCHC RBC AUTO-ENTMCNC: 32.8 G/DL (ref 31.5–36.5)
MCV RBC AUTO: 87.1 FL (ref 78–100)
NONHDLC SERPL-MCNC: 116 MG/DL
PLATELET # BLD AUTO: 323 10E3/UL (ref 150–450)
POTASSIUM SERPL-SCNC: 4.3 MMOL/L (ref 3.4–5.3)
PROT SERPL-MCNC: 7.1 G/DL (ref 6.4–8.3)
RBC # BLD AUTO: 5.64 10E6/UL (ref 3.8–5.2)
RHEUMATOID FACT SERPL-ACNC: <10 IU/ML
SODIUM SERPL-SCNC: 136 MMOL/L (ref 135–145)
TRIGL SERPL-MCNC: 191 MG/DL
TSH SERPL DL<=0.005 MIU/L-ACNC: 1.23 UIU/ML (ref 0.3–4.2)
WBC # BLD AUTO: 9.8 10E3/UL (ref 4–11)

## 2025-08-18 PROCEDURE — 85027 COMPLETE CBC AUTOMATED: CPT | Performed by: FAMILY MEDICINE

## 2025-08-18 PROCEDURE — 80061 LIPID PANEL: CPT | Performed by: FAMILY MEDICINE

## 2025-08-18 PROCEDURE — G2211 COMPLEX E/M VISIT ADD ON: HCPCS | Performed by: FAMILY MEDICINE

## 2025-08-18 PROCEDURE — 86431 RHEUMATOID FACTOR QUANT: CPT | Performed by: FAMILY MEDICINE

## 2025-08-18 PROCEDURE — 86803 HEPATITIS C AB TEST: CPT | Performed by: FAMILY MEDICINE

## 2025-08-18 PROCEDURE — 80053 COMPREHEN METABOLIC PANEL: CPT | Performed by: FAMILY MEDICINE

## 2025-08-18 PROCEDURE — 3079F DIAST BP 80-89 MM HG: CPT | Performed by: FAMILY MEDICINE

## 2025-08-18 PROCEDURE — 3074F SYST BP LT 130 MM HG: CPT | Performed by: FAMILY MEDICINE

## 2025-08-18 PROCEDURE — 85652 RBC SED RATE AUTOMATED: CPT | Performed by: FAMILY MEDICINE

## 2025-08-18 PROCEDURE — 86038 ANTINUCLEAR ANTIBODIES: CPT | Performed by: FAMILY MEDICINE

## 2025-08-18 PROCEDURE — 36415 COLL VENOUS BLD VENIPUNCTURE: CPT | Performed by: FAMILY MEDICINE

## 2025-08-18 PROCEDURE — 86235 NUCLEAR ANTIGEN ANTIBODY: CPT | Performed by: FAMILY MEDICINE

## 2025-08-18 PROCEDURE — 99214 OFFICE O/P EST MOD 30 MIN: CPT | Mod: 25 | Performed by: FAMILY MEDICINE

## 2025-08-18 PROCEDURE — 1126F AMNT PAIN NOTED NONE PRSNT: CPT | Performed by: FAMILY MEDICINE

## 2025-08-18 PROCEDURE — 86140 C-REACTIVE PROTEIN: CPT | Performed by: FAMILY MEDICINE

## 2025-08-18 PROCEDURE — 84443 ASSAY THYROID STIM HORMONE: CPT | Performed by: FAMILY MEDICINE

## 2025-08-18 PROCEDURE — 99395 PREV VISIT EST AGE 18-39: CPT | Performed by: FAMILY MEDICINE

## 2025-08-18 RX ORDER — DROSPIRENONE AND ETHINYL ESTRADIOL 0.02-3(28)
1 KIT ORAL DAILY
Qty: 84 TABLET | Refills: 4 | Status: SHIPPED | OUTPATIENT
Start: 2025-08-18

## 2025-08-18 RX ORDER — SERTRALINE HYDROCHLORIDE 25 MG/1
25 TABLET, FILM COATED ORAL DAILY
Qty: 30 TABLET | Refills: 1 | Status: SHIPPED | OUTPATIENT
Start: 2025-08-18

## 2025-08-18 SDOH — HEALTH STABILITY: PHYSICAL HEALTH: ON AVERAGE, HOW MANY MINUTES DO YOU ENGAGE IN EXERCISE AT THIS LEVEL?: 30 MIN

## 2025-08-18 SDOH — HEALTH STABILITY: PHYSICAL HEALTH: ON AVERAGE, HOW MANY DAYS PER WEEK DO YOU ENGAGE IN MODERATE TO STRENUOUS EXERCISE (LIKE A BRISK WALK)?: 4 DAYS

## 2025-08-18 ASSESSMENT — PAIN SCALES - GENERAL: PAINLEVEL_OUTOF10: NO PAIN (0)

## 2025-08-18 ASSESSMENT — PATIENT HEALTH QUESTIONNAIRE - PHQ9
SUM OF ALL RESPONSES TO PHQ QUESTIONS 1-9: 8
SUM OF ALL RESPONSES TO PHQ QUESTIONS 1-9: 8
10. IF YOU CHECKED OFF ANY PROBLEMS, HOW DIFFICULT HAVE THESE PROBLEMS MADE IT FOR YOU TO DO YOUR WORK, TAKE CARE OF THINGS AT HOME, OR GET ALONG WITH OTHER PEOPLE: SOMEWHAT DIFFICULT

## 2025-08-18 ASSESSMENT — SOCIAL DETERMINANTS OF HEALTH (SDOH): HOW OFTEN DO YOU GET TOGETHER WITH FRIENDS OR RELATIVES?: TWICE A WEEK

## 2025-08-19 ENCOUNTER — PATIENT OUTREACH (OUTPATIENT)
Dept: CARE COORDINATION | Facility: CLINIC | Age: 40
End: 2025-08-19
Payer: COMMERCIAL

## 2025-08-19 LAB — ANA SER QL IF: NEGATIVE

## 2025-08-20 LAB
ENA SS-A AB SER IA-ACNC: <0.5 U/ML
ENA SS-A AB SER IA-ACNC: NEGATIVE
ENA SS-B IGG SER IA-ACNC: <0.6 U/ML
ENA SS-B IGG SER IA-ACNC: NEGATIVE

## 2025-08-21 ENCOUNTER — PATIENT OUTREACH (OUTPATIENT)
Dept: CARE COORDINATION | Facility: CLINIC | Age: 40
End: 2025-08-21
Payer: COMMERCIAL